# Patient Record
Sex: MALE | URBAN - METROPOLITAN AREA
[De-identification: names, ages, dates, MRNs, and addresses within clinical notes are randomized per-mention and may not be internally consistent; named-entity substitution may affect disease eponyms.]

---

## 2022-08-19 ENCOUNTER — OFFICE VISIT (OUTPATIENT)
Dept: DERMATOLOGY CLINIC | Facility: CLINIC | Age: 42
End: 2022-08-19
Payer: COMMERCIAL

## 2022-08-19 DIAGNOSIS — L42 PITYRIASIS ROSEA: Primary | ICD-10-CM

## 2022-08-19 PROCEDURE — 99203 OFFICE O/P NEW LOW 30 MIN: CPT | Performed by: PHYSICIAN ASSISTANT

## 2022-08-19 NOTE — PROGRESS NOTES
HPI:    Patient ID: Obi Finley is a 38year old male. Patient presents for a rash that has been present for several weeks. He noticed one blotch on his abdomen or leg and then it started. He was an an Air B and B as well. It has been spreading. Denies itching. No draining or tenderness noted either. Denies personal hx of skin cancer. Grandfather had skin cancer. No new products used. No recent illness noted. No allergies to medications noted. Review of Systems   Constitutional: Negative for chills and fever. Musculoskeletal: Negative for arthralgias and myalgias. Skin: Positive for rash. Negative for color change and wound. No current outpatient medications on file. Allergies:No Known Allergies   There were no vitals taken for this visit. There is no height or weight on file to calculate BMI. PHYSICAL EXAM:   Physical Exam  Constitutional:       General: He is not in acute distress. Appearance: Normal appearance. Skin:     General: Skin is warm and dry. Findings: Rash present. Comments: Erythematous patches noted on the abdomen and back. No draining or tenderness noted. No scaling noted. [place] tree pattern noted on the back. Neurological:      Mental Status: He is alert and oriented to person, place, and time. ASSESSMENT/PLAN:   1. Pityriasis rosea  -After discussion with patient, advised the following:  -Viral rash  -Will improve on its own in several weeks  -can use triamcinolone as needed for itching   -To call or follow-up with worsening symptoms or concerns.   -Pt was agreeable to plan and will comply with discussion above. No orders of the defined types were placed in this encounter.       Meds This Visit:  Requested Prescriptions      No prescriptions requested or ordered in this encounter       Imaging & Referrals:  None         KQ#[id number]

## 2025-07-26 ENCOUNTER — OFFICE VISIT (OUTPATIENT)
Dept: DERMATOLOGY CLINIC | Facility: CLINIC | Age: 45
End: 2025-07-26

## 2025-07-26 DIAGNOSIS — D48.5 NEOPLASM OF UNCERTAIN BEHAVIOR OF SKIN: Primary | ICD-10-CM

## 2025-07-26 PROCEDURE — 11102 TANGNTL BX SKIN SINGLE LES: CPT | Performed by: PHYSICIAN ASSISTANT

## 2025-07-26 PROCEDURE — 88305 TISSUE EXAM BY PATHOLOGIST: CPT | Performed by: PHYSICIAN ASSISTANT

## 2025-07-26 NOTE — PROCEDURES
Procedure: Shave biopsy     With the patient is a supine position, the skin was scrubbed with alcohol.  Anesthesia was obtained by injecting 2 mL of 1% Xylocaine with Epinephrine. The skin surrounding the lesion on left cheek of face was placed under tension and the lesion was incised using a dermablade. The specimen was sent for histopathological examination.  Hemostasis was obtained with cautery and/or aluminum chloride.  The biopsy site was dressed with bandaid and petroleum jelly.     The estimated blood loss was < 1mL.     Clinical Dx & Size of lesion(s):    Lesion 1 - [place] vs SCC vs SK- size: 4 mm     Gaudencio tolerated the procedure well.  Complications:  none

## 2025-07-26 NOTE — PROGRESS NOTES
HPI:    Patient ID: Gaudencio Hood is a 45 year old male.    Patient presents for lesion of concern on the face. States the lesion has changed and has been there for some time. States it keeps scabbing over. States his father might have had some skin cancer in the past but patient denies personal hx of skinc ancer. No allergies to medications noted.         Review of Systems   Constitutional:  Negative for chills and fever.   Musculoskeletal:  Negative for arthralgias and myalgias.   Skin:  Positive for rash. Negative for color change and wound.          Current Medications[1]  Allergies:Allergies[2]   There were no vitals taken for this visit.  There is no height or weight on file to calculate BMI.  PHYSICAL EXAM:   Physical Exam  Constitutional:       General: He is not in acute distress.     Appearance: Normal appearance.   Skin:     General: Skin is warm and dry.      Findings: Rash present.      Comments: Raised papular lesion on the left cheek of face. Slight erythema noted. No draining or tendenress noted. No scaling noted. About 4 mm in size.    Neurological:      Mental Status: He is alert and oriented to person, place, and time.                ASSESSMENT/PLAN:   1. Neoplasm of uncertain behavior of skin  -After discussion with patient, advised the following:  -Performed shave biopsy  -Sent to pathology  -Will call with results.   -Will base treatment on results.   -Care instructions given.   -To call or follow-up with worsening symptoms or concerns.   -Pt was agreeable to plan and will comply with discussion above.     - Specimen to Pathology Tissue      Orders Placed This Encounter   Procedures    Specimen to Pathology Tissue       Meds This Visit:  Requested Prescriptions      No prescriptions requested or ordered in this encounter       Imaging & Referrals:  None         ID#[id number]       [1]   Current Outpatient Medications   Medication Sig Dispense Refill    triamcinolone 0.1 % External Ointment Apply  1 Application topically 2 (two) times daily. (Patient not taking: Reported on 7/26/2025) 80 g 0   [2] No Known Allergies

## 2025-08-08 ENCOUNTER — OFFICE VISIT (OUTPATIENT)
Dept: DERMATOLOGY CLINIC | Facility: CLINIC | Age: 45
End: 2025-08-08

## 2025-08-08 DIAGNOSIS — L82.1 SK (SEBORRHEIC KERATOSIS): ICD-10-CM

## 2025-08-08 DIAGNOSIS — D22.9 MULTIPLE NEVI: ICD-10-CM

## 2025-08-08 DIAGNOSIS — D23.9 BENIGN NEOPLASM OF SKIN, UNSPECIFIED LOCATION: ICD-10-CM

## 2025-08-08 DIAGNOSIS — D48.5 NEOPLASM OF UNCERTAIN BEHAVIOR OF SKIN: Primary | ICD-10-CM

## 2025-08-08 DIAGNOSIS — L81.4 LENTIGO: ICD-10-CM

## 2025-08-08 DIAGNOSIS — L73.8 SEBACEOUS HYPERPLASIA: ICD-10-CM

## 2025-08-08 DIAGNOSIS — D18.01 CHERRY HEMANGIOMA: ICD-10-CM

## 2025-08-08 PROCEDURE — 88305 TISSUE EXAM BY PATHOLOGIST: CPT | Performed by: DERMATOLOGY

## 2025-08-12 ENCOUNTER — RESULTS FOLLOW-UP (OUTPATIENT)
Dept: DERMATOLOGY CLINIC | Facility: CLINIC | Age: 45
End: 2025-08-12